# Patient Record
Sex: FEMALE | Race: ASIAN | Employment: FULL TIME | URBAN - METROPOLITAN AREA
[De-identification: names, ages, dates, MRNs, and addresses within clinical notes are randomized per-mention and may not be internally consistent; named-entity substitution may affect disease eponyms.]

---

## 2019-02-14 ENCOUNTER — RASH (OUTPATIENT)
Dept: URBAN - METROPOLITAN AREA CLINIC 31 | Facility: CLINIC | Age: 29
Setting detail: DERMATOLOGY
End: 2019-02-14

## 2019-02-14 DIAGNOSIS — R68.89 OTHER GENERAL SYMPTOMS AND SIGNS: ICD-10-CM

## 2019-02-14 PROCEDURE — 11900 INJECT SKIN LESIONS </W 7: CPT

## 2019-02-14 PROCEDURE — 99202 OFFICE O/P NEW SF 15 MIN: CPT

## 2019-02-14 RX ORDER — KETOCONAZOLE 20 MG/ML
1 APPLICATION SHAMPOO, SUSPENSION TOPICAL AS DIRECTED
Qty: 120 | Refills: 2
Start: 2019-02-14

## 2019-02-14 RX ORDER — MUPIROCIN 20 MG/G
1 APPLICATION OINTMENT TOPICAL BID
Qty: 30 | Refills: 1
Start: 2019-02-14

## 2021-09-28 ENCOUNTER — DASHBOARD ENCOUNTERS (OUTPATIENT)
Age: 31
End: 2021-09-28

## 2021-09-28 ENCOUNTER — OFFICE VISIT (OUTPATIENT)
Dept: URBAN - METROPOLITAN AREA CLINIC 12 | Facility: CLINIC | Age: 31
End: 2021-09-28
Payer: COMMERCIAL

## 2021-09-28 ENCOUNTER — WEB ENCOUNTER (OUTPATIENT)
Dept: URBAN - METROPOLITAN AREA CLINIC 12 | Facility: CLINIC | Age: 31
End: 2021-09-28

## 2021-09-28 VITALS
TEMPERATURE: 96.9 F | DIASTOLIC BLOOD PRESSURE: 63 MMHG | SYSTOLIC BLOOD PRESSURE: 113 MMHG | HEIGHT: 63 IN | HEART RATE: 55 BPM | BODY MASS INDEX: 27.11 KG/M2 | WEIGHT: 153 LBS

## 2021-09-28 DIAGNOSIS — R19.6 HALITOSIS: ICD-10-CM

## 2021-09-28 PROBLEM — 79879001: Status: ACTIVE | Noted: 2021-09-28

## 2021-09-28 PROCEDURE — 99204 OFFICE O/P NEW MOD 45 MIN: CPT | Performed by: INTERNAL MEDICINE

## 2021-09-28 NOTE — HPI-TODAY'S VISIT:
31F here with c/o bad breath saw dentist who told her thewy did not find anything to explain the bad breath her PCP told her to come see GI she denies any GERD, abd pain, N/V. BM reg. no blood. No abd surgeries

## 2021-09-28 NOTE — EXAM-PHYSICAL EXAM
General--no acute distress Abdomen--soft, non tender, non distended, bowel sounds present Exam of the oral cavity does not show any thrush, sores, redness